# Patient Record
Sex: FEMALE | Race: OTHER
[De-identification: names, ages, dates, MRNs, and addresses within clinical notes are randomized per-mention and may not be internally consistent; named-entity substitution may affect disease eponyms.]

---

## 2020-08-10 ENCOUNTER — APPOINTMENT (OUTPATIENT)
Dept: SURGERY | Facility: CLINIC | Age: 40
End: 2020-08-10
Payer: COMMERCIAL

## 2020-08-10 VITALS
WEIGHT: 175 LBS | HEIGHT: 63 IN | BODY MASS INDEX: 31.01 KG/M2 | DIASTOLIC BLOOD PRESSURE: 83 MMHG | SYSTOLIC BLOOD PRESSURE: 135 MMHG | TEMPERATURE: 97.9 F | HEART RATE: 73 BPM

## 2020-08-10 DIAGNOSIS — R10.9 UNSPECIFIED ABDOMINAL PAIN: ICD-10-CM

## 2020-08-10 PROBLEM — Z00.00 ENCOUNTER FOR PREVENTIVE HEALTH EXAMINATION: Status: ACTIVE | Noted: 2020-08-10

## 2020-08-10 PROCEDURE — 99203 OFFICE O/P NEW LOW 30 MIN: CPT

## 2020-08-10 NOTE — PHYSICAL EXAM
[Normal Thyroid] : the thyroid was normal [Respiratory Effort] : normal respiratory effort [No Rash or Lesion] : No rash or lesion [Normal Rate and Rhythm] : normal rate and rhythm [Anxious] : anxious [Alert] : alert [de-identified] : soft, nontender nondistended; at the left lateral aspect of her incision, there is significant tenderness and some scarring palpable but there is no evidence of hernia, erythema, infection or other abnormality. [de-identified] : Anicteric [de-identified] : NAD, well-appearing

## 2020-08-10 NOTE — HISTORY OF PRESENT ILLNESS
[de-identified] : 41 yo F with no significant PMH who presents with worsening pain at the left lateral aspect of her prior  incision. She reports chronic discomfort in this area but there was an acute exacerbation starting mid last week and it became unbearable over the weekend so patient went to ED. Her workup with labs and CT was normal. There was no evidence of hernia on CT. She was referred to surgery for further evaluation. She reports a mild bulge there that is not new and constant pressure and tenderness. It is worsened by straining and pushing on the area. No position makes it better. The patient has been taking advil since the ER visit 2 days ago and her pain is mildly improved but not at baseline. SHe denies nausea, vomiting, fevers, chills or changes in her bowel habits. She had her period 3 weeks ago and this pain is not cyclical with her cycles.

## 2020-08-10 NOTE — ASSESSMENT
[FreeTextEntry1] : 39 yo F with unclear etiology of abdominal wall pain at her prior incision site. There is no hernia there but there may be some exacerbation of inflammation of old scar tissue.

## 2020-08-10 NOTE — PLAN
[FreeTextEntry1] : Will try to obtain MRI to better characterize the soft tissue in the area\par Advised to continue around the clock advil for 5 days\par Follow up as needed or based on MRI findings

## 2020-08-10 NOTE — CONSULT LETTER
[Courtesy Letter:] : I had the pleasure of seeing your patient, [unfilled], in my office today. [Dear  ___] : Dear  [unfilled], [Consult Closing:] : Thank you very much for allowing me to participate in the care of this patient.  If you have any questions, please do not hesitate to contact me. [Please see my note below.] : Please see my note below. [FreeTextEntry3] : Jesika Merino MD [Sincerely,] : Sincerely,

## 2023-10-02 ENCOUNTER — TRANSCRIPTION ENCOUNTER (OUTPATIENT)
Age: 43
End: 2023-10-02